# Patient Record
Sex: MALE
[De-identification: names, ages, dates, MRNs, and addresses within clinical notes are randomized per-mention and may not be internally consistent; named-entity substitution may affect disease eponyms.]

---

## 2018-08-12 ENCOUNTER — HOSPITAL ENCOUNTER (EMERGENCY)
Dept: HOSPITAL 31 - C.ER | Age: 28
Discharge: HOME | End: 2018-08-12
Payer: MEDICAID

## 2018-08-12 VITALS
TEMPERATURE: 99.1 F | DIASTOLIC BLOOD PRESSURE: 67 MMHG | RESPIRATION RATE: 18 BRPM | SYSTOLIC BLOOD PRESSURE: 113 MMHG | OXYGEN SATURATION: 97 % | HEART RATE: 90 BPM

## 2018-08-12 VITALS — BODY MASS INDEX: 22.1 KG/M2

## 2018-08-12 DIAGNOSIS — Z20.2: Primary | ICD-10-CM

## 2018-08-12 DIAGNOSIS — L02.31: ICD-10-CM

## 2018-08-12 DIAGNOSIS — L73.9: ICD-10-CM

## 2018-08-12 LAB
RAPID PLASMA REAGIN: REACTIVE
RPR TITER: (no result)

## 2018-08-12 PROCEDURE — 87491 CHLMYD TRACH DNA AMP PROBE: CPT

## 2018-08-12 PROCEDURE — 87591 N.GONORRHOEAE DNA AMP PROB: CPT

## 2018-08-12 PROCEDURE — 82948 REAGENT STRIP/BLOOD GLUCOSE: CPT

## 2018-08-12 PROCEDURE — 86780 TREPONEMA PALLIDUM: CPT

## 2018-08-12 PROCEDURE — 99283 EMERGENCY DEPT VISIT LOW MDM: CPT

## 2018-08-12 PROCEDURE — 86592 SYPHILIS TEST NON-TREP QUAL: CPT

## 2018-08-12 PROCEDURE — 96372 THER/PROPH/DIAG INJ SC/IM: CPT

## 2018-08-12 NOTE — C.PDOC
History Of Present Illness


27-year-old male, PMHx includes HIV, resents to the emergency department with 

complaints of multiple abscesses to his buttocks and axilla. Patient notes he 

has tried multiple home remedies, and one of the abscesses has popped and 

expressed pus. Additionally, patient reports that over the past week, he has 

noticed swelling and pain to his right groin. The last time this happened, he 

had syphilis. Patient was seen at clinic, where they did blood test, but he has 

not heard back from them yet. Patient requesting a Penicillin shot. Denies fever

, other rashes, dysuria, hematuria, penile discharge, penile pain, scrotal or 

testicular pain. 


Time Seen by Provider: 08/12/18 13:57


Chief Complaint (Nursing): Abnormal Skin Integrity


History Per: Patient


History/Exam Limitations: no limitations


Onset/Duration Of Symptoms: Days


Current Symptoms Are (Timing): Still Present





Past Medical History


Reviewed: Historical Data, Nursing Documentation, Vital Signs


Vital Signs: 


 Last Vital Signs











Temp  99.1 F   08/12/18 13:47


 


Pulse  90   08/12/18 13:47


 


Resp  18   08/12/18 13:47


 


BP  113/67   08/12/18 13:47


 


Pulse Ox  97   08/12/18 16:24














- Medical History


PMH: HIV


Family History: States: No Known Family Hx





- Social History


Hx Alcohol Use: Yes


Hx Substance Use: Yes (CRYSTAL METH)





Review Of Systems


Constitutional: Negative for: Fever


Gastrointestinal: Negative for: Nausea, Vomiting


Genitourinary: Positive for: Other (inguinal swelling).  Negative for: Dysuria, 

Frequency, Hematuria


Skin: Positive for: Other (multiple abscesses)





Physical Exam





- Physical Exam


Appears: Non-toxic, No Acute Distress


Skin: Normal Color, Warm, Dry, No Rash


Head: Atraumatic, Normacephalic


Eye(s): bilateral: Normal Inspection


Nose: Normal


Oral Mucosa: Moist


Lips: Normal Appearing


Neck: Normal ROM


Chest: Symmetrical


Cardiovascular: Rhythm Regular, No Murmur


Respiratory: Normal Breath Sounds, No Decreased Breath Sounds, No Accessory 

Muscle Use


Gastrointestinal/Abdominal: Soft, No Tenderness


Back: No CVA Tenderness


Male Genital: No Testicular Tenderness, Other (circumcised. no discharge, or 

swelling.  +right inguinal swelling, lymphadenopathy, tenderness. No erythema.)


Extremity: Normal ROM, No Deformity, Other ( right axilla has small 1cm 

erythematous mass, right buttocks 2x3cm tender indurated mass. folliculitis to 

buttocks.)


Neurological/Psych: Oriented x3, Normal Speech





ED Course And Treatment


O2 Sat by Pulse Oximetry: 97


Pulse Ox Interpretation: Normal (RA)





Medical Decision Making


Medical Decision Making: 





Plan:


* RPR


* Chlamydia/GC


* Penicillin





Orders placed for labs and will call back with results. Patient was treated 

with Penicillin. Advise patient to apply warm compress to right buttocks and 

may need another evaluation in 2 days for possible drainage. Advise to follow 

up with clinic for further STD testing. 





Disposition


Counseled Patient/Family Regarding: Diagnosis, Need For Followup, Rx Given





- Disposition


Disposition: HOME/ ROUTINE


Disposition Time: 18:57


Condition: STABLE


Additional Instructions: 


Apply warm compress to the area


Take antibiotics as prescribed


Call back for results in 2-3 days [phone]


Follow up with your doctor


Prescriptions: 


Cephalexin [cephalexin] 500 mg PO Q12 #14 cap


Sulfamethoxazole/Trimethoprim [Bactrim  mg-160 mg] 1 tab PO BID #14 tab


Instructions:  Boil (DC)


Forms:  Work Excuse


Print Language: Polish





- POA


Present On Arrival: None





- Clinical Impression


Clinical Impression: 


 Exposure to STD, Abscess of buttock, right, Folliculitis








- Scribe Statement


The provider has reviewed the documentation as recorded by the Scribe (Milka Urrutia)


All medical record entries made by the Scribe were at my direction and 

personally dictated by me. I have reviewed the chart and agree that the record 

accurately reflects my personal performance of the history, physical exam, 

medical decision making, and the department course for this patient. I have 

also personally directed, reviewed, and agree with the discharge instructions 

and disposition.

## 2018-09-28 ENCOUNTER — HOSPITAL ENCOUNTER (EMERGENCY)
Dept: HOSPITAL 31 - C.ER | Age: 28
LOS: 1 days | Discharge: HOME | End: 2018-09-29
Payer: MEDICAID

## 2018-09-28 VITALS — BODY MASS INDEX: 22.1 KG/M2

## 2018-09-28 DIAGNOSIS — L02.31: Primary | ICD-10-CM

## 2018-09-28 DIAGNOSIS — Z20.2: ICD-10-CM

## 2018-09-28 PROCEDURE — 90471 IMMUNIZATION ADMIN: CPT

## 2018-09-28 PROCEDURE — 96372 THER/PROPH/DIAG INJ SC/IM: CPT

## 2018-09-28 PROCEDURE — 90715 TDAP VACCINE 7 YRS/> IM: CPT

## 2018-09-28 PROCEDURE — 87070 CULTURE OTHR SPECIMN AEROBIC: CPT

## 2018-09-28 PROCEDURE — 99284 EMERGENCY DEPT VISIT MOD MDM: CPT

## 2018-09-28 PROCEDURE — 10060 I&D ABSCESS SIMPLE/SINGLE: CPT

## 2018-09-28 PROCEDURE — 87181 SC STD AGAR DILUTION PER AGT: CPT

## 2018-09-28 NOTE — C.PDOC
History Of Present Illness


29 yo male with PMH HIV c/o swelling and pain to the right buttock for 3 days. 

Pt notes he had a similar episode last month where he was evaluated by MICHEL[facility], 

diagnosed with syphilis and treated with IM PCN with symptom resolution. Pt 

notes symptoms restarted 3 days ago and he is requesting the "shot" again. Notes

possible exposure to syphilis again. Reports he is complaint with his 

antivirals, had his labs checked 2 weeks ago and they were "good". Denies penile

lesions/ discharge, testicular pain, joint pain, dysuria, or fever.  


Time Seen by Provider: 09/28/18 22:49


Chief Complaint (Nursing): Abnormal Skin Integrity


History Per: Patient


History/Exam Limitations: no limitations


Onset/Duration Of Symptoms: Days (3)


Current Symptoms Are (Timing): Still Present





Past Medical History


Vital Signs: 





                                Last Vital Signs











Temp  98.4 F   09/28/18 22:57


 


Pulse  97 H  09/28/18 22:57


 


Resp  20   09/28/18 22:57


 


BP  110/70   09/28/18 22:57


 


Pulse Ox  96   09/28/18 22:57














- Medical History


PMH: HIV


Family History: States: Unknown Family Hx





- Social History


Hx Alcohol Use: Yes


Hx Substance Use: Yes (CRYSTAL METH)





Review Of Systems


Except As Marked, All Systems Reviewed And Found Negative.





Physical Exam





- Physical Exam


Appears: Well, Non-toxic, No Acute Distress


Skin: Warm, Dry, Other ((+) 3 cm area of erythema, swelling and tenderness with 

central fluctuance to the right buttock )


Head: Atraumatic, Normacephalic


Eye(s): bilateral: Normal Inspection, EOMI


Nose: Normal


Oral Mucosa: Moist


Neck: Normal, Normal ROM, Supple


Lymphatic: Inguinal Node Tenderness (right inguinal)


Chest: Symmetrical


Cardiovascular: Rhythm Regular


Respiratory: Normal Breath Sounds


Gastrointestinal/Abdominal: Normal Exam, Soft, No Tenderness


Back: Normal Inspection


Male Genital: Normal Inspection


Extremity: Normal ROM


Neurological/Psych: Oriented x3, Normal Speech





ED Course And Treatment


O2 Sat by Pulse Oximetry: 96


Progress Note: RPR unavaible for results today. Previous visit evaluated: (+) 

RPR.  I&D preformed. Tetanus given. Discussed would care. Discussed safe sex and

strict follow up in 1-2 days.  Case discussed with Dr Lindsay, agreed upon plan 

and treatment.





- Incision & Drainage Of Abscess


Anesthesia: Lidocaine 1%, With Epi


Prep Used: Sterile Water, Betadine


Procedure: Incised W/Scalpel Blade#: (11), Drained Pus, Irrigated Cavity 

W/Saline, Probed To Break Up Loculations, Packed W/Gauze, Cultures Obtained And 

Sent To Lab





Disposition





- Disposition


Disposition: HOME/ ROUTINE


Disposition Time: 23:59


Condition: STABLE


Additional Instructions: 


Wound check in 2 days. 


Prescriptions: 


Clindamycin [Cleocin] 300 mg PO Q6 #28 cap


Instructions:  Abscess Incision and Drainage (DC)


Forms:  [employer] (English)


Print Language: Bengali





- Clinical Impression


Clinical Impression: 


 Abscess of buttock, right, Exposure to STD

## 2018-09-29 VITALS
SYSTOLIC BLOOD PRESSURE: 108 MMHG | DIASTOLIC BLOOD PRESSURE: 70 MMHG | TEMPERATURE: 98.9 F | HEART RATE: 81 BPM | RESPIRATION RATE: 16 BRPM

## 2018-09-29 VITALS — OXYGEN SATURATION: 96 %

## 2018-09-30 ENCOUNTER — HOSPITAL ENCOUNTER (EMERGENCY)
Dept: HOSPITAL 31 - C.ER | Age: 28
Discharge: HOME | End: 2018-09-30
Payer: MEDICAID

## 2018-09-30 VITALS
TEMPERATURE: 98.6 F | SYSTOLIC BLOOD PRESSURE: 123 MMHG | DIASTOLIC BLOOD PRESSURE: 73 MMHG | RESPIRATION RATE: 16 BRPM | OXYGEN SATURATION: 98 % | HEART RATE: 95 BPM

## 2018-09-30 VITALS — BODY MASS INDEX: 22.1 KG/M2

## 2018-09-30 DIAGNOSIS — Z48.00: Primary | ICD-10-CM

## 2018-09-30 DIAGNOSIS — L02.31: ICD-10-CM

## 2018-09-30 NOTE — C.PDOC
Addendum entered and electronically signed by Jody Corona PA  10/01/18 

11:16: 








Addendum


Addendum: 





10/01/18 11:03


pt called back with wound culture results, stating Clindamycin is resistant, 

message left on phone.





Original Note:








History Of Present Illness





28-year-old male, presents to the emergency department for packing removal. 

Patient had an abscess drained two days ago on right butt cheek. States he feels

better. Denies fever, nausea/vomiting, or new symptoms. No other complaints at 

this time.


Time Seen by Provider: 09/30/18 15:04


Chief Complaint (Nursing): Abnormal Skin Integrity


History Per: Patient


History/Exam Limitations: no limitations





Past Medical History


Reviewed: Historical Data, Nursing Documentation, Vital Signs


Vital Signs: 





                                Last Vital Signs











Temp  98.6 F   09/30/18 15:04


 


Pulse  95 H  09/30/18 15:04


 


Resp  16   09/30/18 15:04


 


BP  123/73   09/30/18 15:04


 


Pulse Ox  98   09/30/18 15:04














- Medical History


PMH: HIV


Family History: States: No Known Family Hx





- Social History


Hx Alcohol Use: Yes


Hx Substance Use: Yes (CRYSTAL METH)





Review Of Systems


Constitutional: Negative for: Fever, Chills


Gastrointestinal: Negative for: Nausea, Vomiting


Neurological: Negative for: Weakness, Numbness





Physical Exam





- Physical Exam


Appears: Non-toxic, No Acute Distress


Skin: Warm, Dry, No Rash


Head: Atraumatic, Normacephalic


Eye(s): bilateral: Normal Inspection


Oral Mucosa: Moist


Extremity: Normal ROM, No Deformity, No Swelling, Other (Healing abscess to 

right buttock with packing in place)


Neurological/Psych: Oriented x3, Normal Speech, Normal Motor, Normal Sensation


Gait: Steady





ED Course And Treatment


O2 Sat by Pulse Oximetry: 98


Pulse Ox Interpretation: Normal (RA)





Medical Decision Making


Medical Decision Making: 





packing removed from abscess wound with no difficulty. Pt tolerated well





Disposition





- Disposition


Referrals: 


[facility] [Outside]


[facility] [[facility]


Disposition: HOME/ ROUTINE


Disposition Time: 15:31


Condition: STABLE


Additional Instructions: 


Continue taking the antibiotics until completed. 


Follow up with the medical doctor within 1-2 days without fail. Return if 

worsened. 


Instructions:  Wound Care (DC)


Forms:  [facility] (English)





- Clinical Impression


Clinical Impression: 


 Wound check, abscess, Abscess packing removal








- Scribe Statement


The provider has reviewed the documentation as recorded by the Scribe (Milka Urrutia)








All medical record entries made by the Scribe were at my direction and 

personally dictated by me. I have reviewed the chart and agree that the record 

accurately reflects my personal performance of the history, physical exam, 

medical decision making, and the department course for this patient. I have also

 personally directed, reviewed, and agree with the discharge instructions and 

disposition.

## 2019-02-24 ENCOUNTER — HOSPITAL ENCOUNTER (EMERGENCY)
Dept: HOSPITAL 31 - C.ER | Age: 29
Discharge: HOME | End: 2019-02-24
Payer: SELF-PAY

## 2019-02-24 VITALS
RESPIRATION RATE: 20 BRPM | SYSTOLIC BLOOD PRESSURE: 110 MMHG | TEMPERATURE: 98.4 F | HEART RATE: 80 BPM | DIASTOLIC BLOOD PRESSURE: 71 MMHG

## 2019-02-24 VITALS — BODY MASS INDEX: 22.1 KG/M2

## 2019-02-24 VITALS — OXYGEN SATURATION: 100 %

## 2019-02-24 DIAGNOSIS — N34.2: Primary | ICD-10-CM

## 2019-02-24 LAB
BACTERIA #/AREA URNS HPF: (no result) /[HPF]
BILIRUB UR-MCNC: NEGATIVE MG/DL
GLUCOSE UR STRIP-MCNC: NORMAL MG/DL
LEUKOCYTE ESTERASE UR-ACNC: (no result) LEU/UL
PH UR STRIP: 5 [PH] (ref 5–8)
PROT UR STRIP-MCNC: NEGATIVE MG/DL
RBC # UR STRIP: NEGATIVE /UL
SP GR UR STRIP: 1.02 (ref 1–1.03)
UROBILINOGEN UR-MCNC: 2 MG/DL (ref 0.2–1)

## 2019-02-24 PROCEDURE — 81001 URINALYSIS AUTO W/SCOPE: CPT

## 2019-02-24 PROCEDURE — 96372 THER/PROPH/DIAG INJ SC/IM: CPT

## 2019-02-24 PROCEDURE — 87591 N.GONORRHOEAE DNA AMP PROB: CPT

## 2019-02-24 PROCEDURE — 99284 EMERGENCY DEPT VISIT MOD MDM: CPT

## 2019-02-24 PROCEDURE — 87491 CHLMYD TRACH DNA AMP PROBE: CPT

## 2019-02-24 NOTE — C.PDOC
History Of Present Illness


28 year old male presents to the emergency department with complaints of penile 

discharge associated with dysuria for the last two days. Patient reports recent 

history of unprotected sex. Patient denies abdominal pain, hematuria or fever. 


Time Seen by Provider: 02/24/19 19:18


Chief Complaint (Nursing): Male Genitourinary


History Per: Patient


History/Exam Limitations: no limitations


Onset/Duration Of Symptoms: Days (2)


Current Symptoms Are (Timing): Still Present


Quality Of Discomfort: "Pain"


Associated Symptoms: Urinary Symptoms (dysuria, penile discharge).  denies: 

Fever, Other (abdominal pain)





Past Medical History


Reviewed: Historical Data, Nursing Documentation, Vital Signs


Vital Signs: 





                                Last Vital Signs











Temp  98.6 F   02/24/19 18:58


 


Pulse  96 H  02/24/19 18:58


 


Resp  18   02/24/19 18:58


 


BP  118/67   02/24/19 18:58


 


Pulse Ox  100   02/24/19 18:58














- Medical History


PMH: HIV


Surgical History: No Surg Hx


Family History: States: No Known Family Hx





- Social History


Hx Alcohol Use: Yes


Hx Substance Use: Yes





- Immunization History


Hx Tetanus Toxoid Vaccination: No


Hx Influenza Vaccination: No


Hx Pneumococcal Vaccination: No





Review Of Systems


Except As Marked, All Systems Reviewed And Found Negative.


Constitutional: Negative for: Fever, Chills


Genitourinary: Positive for: Dysuria, Penile Discharge





Physical Exam





- Physical Exam


Appears: Non-toxic, No Acute Distress


Skin: Normal Color, Warm, Dry


Head: Atraumatic, Normacephalic


Eye(s): bilateral: Normal Inspection, PERRL, EOMI


Nose: Normal


Oral Mucosa: Moist


Neck: Normal, Supple


Chest: Symmetrical, No Tenderness


Cardiovascular: Rhythm Regular, No Murmur


Respiratory: Normal Breath Sounds, No Rales, No Rhonchi, No Wheezing


Gastrointestinal/Abdominal: Soft, No Tenderness


Male Genital: No Testicular Tenderness, No Testicular Swelling, No Scrotal 

Swelling, Other (white purulent penile discharge, no lesions)


Neurological/Psych: Oriented x3, Normal Speech, Normal Cognition





ED Course And Treatment





- Laboratory Results


Lab Results: 





                                        











Urine Color  [place]  (YELLOW)   02/24/19  19:27    


 


Urine Clarity  [place]  (Clear)   02/24/19  19:27    


 


Urine pH  5.0  (5.0-8.0)   02/24/19  19:27    


 


Ur Specific Gravity  1.021  (1.003-1.030)   02/24/19  19:27    


 


Urine Protein  Negative mg/dL (NEGATIVE)   02/24/19  19:27    


 


Urine Glucose (UA)  Normal mg/dL (Normal)   02/24/19  19:27    


 


Urine Ketones  Negative mg/dL (NEGATIVE)   02/24/19  19:27    


 


Urine Blood  Negative  (NEGATIVE)   02/24/19  19:27    


 


Urine Nitrate  Negative  (NEGATIVE)   02/24/19  19:27    


 


Urine Bilirubin  Negative  (NEGATIVE)   02/24/19  19:27    


 


Urine Urobilinogen  2.0 mg/dL (0.2-1.0)   02/24/19  19:27    


 


Ur Leukocyte Esterase  2+ Trisha/uL (Negative)  H  02/24/19  19:27    


 


Urine WBC (Auto)  88 /hpf (0-5)  H  02/24/19  19:27    


 


Urine RBC (Auto)  3 /hpf (0-3)   02/24/19  19:27    


 


Urine Bacteria  Few  (<OCC)  H  02/24/19  19:27    











O2 Sat by Pulse Oximetry: 100 (RA)


Pulse Ox Interpretation: Normal


Progress Note: Plan:  Chlamydia [facility]CA.  Rocephin 250mg PO.  Zithromax 1000mg PO.  

Urinalysis.  Test culture sent. Patient treated, and educated on the importance 

of protected sex.





Disposition


Counseled Patient/Family Regarding: Diagnosis, Need For Followup, Rx Given





- Disposition


Disposition: HOME/ ROUTINE


Disposition Time: 20:17


Condition: STABLE


Additional Instructions: 


Please follow up with STD clinic for other STD testing





Practice safe sex





Pt counseled to have sexual partners tested/ treated





Return to ER if worse 


Prescriptions: 


metroNIDAZOLE [Flagyl] 500 mg PO BID #14 tab


Instructions:  Urethritis (DC)


Forms:  [employer] (English)


Print Language: Belarusian





- Clinical Impression


Clinical Impression: 


 Urethritis








- PA / NP / Resident Statement


MD/DO has reviewed & agrees with the documentation as recorded.





- Scribe Statement


The provider has reviewed the documentation as recorded by the Scribe (John Allen)


All medical record entries made by the Scribe were at my direction and 

personally dictated by me. I have reviewed the chart and agree that the record 

accurately reflects my personal performance of the history, physical exam, 

medical decision making, and the department course for this patient. I have also

 personally directed, reviewed, and agree with the discharge instructions and 

disposition.